# Patient Record
Sex: FEMALE | Race: WHITE | NOT HISPANIC OR LATINO | URBAN - METROPOLITAN AREA
[De-identification: names, ages, dates, MRNs, and addresses within clinical notes are randomized per-mention and may not be internally consistent; named-entity substitution may affect disease eponyms.]

---

## 2024-01-18 ENCOUNTER — OFFICE VISIT (OUTPATIENT)
Dept: URBAN - METROPOLITAN AREA CLINIC 48 | Facility: CLINIC | Age: 49
End: 2024-01-18

## 2024-01-18 RX ORDER — APIXABAN 5 MG/1
1 TABLET TABLET, FILM COATED ORAL TWICE A DAY
Qty: 60 TABLET | Refills: 2 | Status: ACTIVE | COMMUNITY

## 2024-01-18 RX ORDER — TIZANIDINE 4 MG/1
1 TABLET AS NEEDED TABLET ORAL THREE TIMES A DAY
Qty: 90 TABLET | Refills: 0 | Status: ACTIVE | COMMUNITY

## 2024-01-18 RX ORDER — METOPROLOL TARTRATE 25 MG/1
1 TABLET WITH FOOD TABLET, FILM COATED ORAL TWICE A DAY
Qty: 180 TABLET | Refills: 0 | Status: ACTIVE | COMMUNITY

## 2024-01-18 RX ORDER — DULOXETINE 60 MG/1
CAPSULE, DELAYED RELEASE ORAL
Qty: 60 APPLICATOR | Refills: 0 | Status: ACTIVE | COMMUNITY

## 2024-01-18 RX ORDER — GABAPENTIN 400 MG/1
1 CAPSULE CAPSULE ORAL
Refills: 0 | Status: ACTIVE | COMMUNITY

## 2024-01-18 RX ORDER — SUMATRIPTAN 50 MG/1
1 TABLET AT LEAST 2 HOURS BETWEEN DOSES AS NEEDED TABLET, FILM COATED ORAL TWICE A DAY
Refills: 0 | Status: ACTIVE | COMMUNITY

## 2024-01-18 RX ORDER — TRAMADOL HYDROCHLORIDE 50 MG/1
1 TABLET AS NEEDED TABLET, FILM COATED ORAL
Refills: 0 | Status: ACTIVE | COMMUNITY

## 2024-01-18 RX ORDER — AZITHROMYCIN DIHYDRATE 250 MG/1
AS DIRECTED TABLET ORAL
Refills: 0 | Status: ACTIVE | COMMUNITY

## 2024-01-18 RX ORDER — FENOFIBRATE 134 MG/1
1 CAPSULE WITH A MEAL CAPSULE ORAL ONCE A DAY
Qty: 90 CAPSULE | Refills: 1 | Status: ACTIVE | COMMUNITY

## 2024-01-18 RX ORDER — CLONAZEPAM 1 MG/1
1 TABLET TABLET ORAL ONCE A DAY
Qty: 30 TABLET | Refills: 1 | Status: ACTIVE | COMMUNITY

## 2024-01-18 RX ORDER — PROMETHAZINE HYDROCHLORIDE 25 MG/1
1 TABLET AS NEEDED TABLET ORAL
Qty: 60 TABLET | Refills: 0 | Status: ACTIVE | COMMUNITY

## 2024-03-14 ENCOUNTER — OV NP (OUTPATIENT)
Dept: URBAN - METROPOLITAN AREA CLINIC 48 | Facility: CLINIC | Age: 49
End: 2024-03-14

## 2024-03-14 RX ORDER — APIXABAN 5 MG/1
1 TABLET TABLET, FILM COATED ORAL TWICE A DAY
Qty: 60 TABLET | Refills: 2 | Status: ACTIVE | COMMUNITY

## 2024-03-14 RX ORDER — FENOFIBRATE 134 MG/1
1 CAPSULE WITH A MEAL CAPSULE ORAL ONCE A DAY
Qty: 90 CAPSULE | Refills: 1 | Status: ACTIVE | COMMUNITY

## 2024-03-14 RX ORDER — TIZANIDINE 4 MG/1
1 TABLET AS NEEDED TABLET ORAL THREE TIMES A DAY
Qty: 90 TABLET | Refills: 0 | Status: ACTIVE | COMMUNITY

## 2024-03-14 RX ORDER — IBUPROFEN 600 MG/1
1 TABLET WITH FOOD OR MILK AS NEEDED TABLET, FILM COATED ORAL THREE TIMES A DAY
Refills: 0 | Status: ACTIVE | COMMUNITY
Start: 1900-01-01

## 2024-03-14 RX ORDER — DULOXETINE 60 MG/1
CAPSULE, DELAYED RELEASE ORAL
Qty: 60 APPLICATOR | Refills: 0 | Status: ACTIVE | COMMUNITY

## 2024-03-14 RX ORDER — SUMATRIPTAN 50 MG/1
1 TABLET AT LEAST 2 HOURS BETWEEN DOSES AS NEEDED TABLET, FILM COATED ORAL TWICE A DAY
Refills: 0 | Status: ACTIVE | COMMUNITY

## 2024-03-14 RX ORDER — AMITRIPTYLINE HYDROCHLORIDE 25 MG/1
1 TABLET AT BEDTIME TABLET, FILM COATED ORAL ONCE A DAY
Refills: 1 | Status: ACTIVE | COMMUNITY
Start: 2018-03-26

## 2024-03-14 RX ORDER — CLONAZEPAM 1 MG/1
1 TABLET TABLET ORAL ONCE A DAY
Qty: 30 TABLET | Refills: 1 | Status: ACTIVE | COMMUNITY

## 2024-03-14 RX ORDER — METOPROLOL TARTRATE 25 MG/1
1 TABLET WITH FOOD TABLET, FILM COATED ORAL TWICE A DAY
Qty: 180 TABLET | Refills: 0 | Status: ACTIVE | COMMUNITY

## 2024-03-14 RX ORDER — PROMETHAZINE HYDROCHLORIDE 25 MG/1
1 TABLET AS NEEDED TABLET ORAL
Qty: 60 TABLET | Refills: 0 | Status: ACTIVE | COMMUNITY

## 2024-03-14 RX ORDER — TRAMADOL HYDROCHLORIDE 50 MG/1
1 TABLET AS NEEDED TABLET, FILM COATED ORAL
Refills: 0 | Status: ACTIVE | COMMUNITY

## 2024-03-14 RX ORDER — GABAPENTIN 400 MG/1
1 CAPSULE CAPSULE ORAL
Refills: 0 | Status: ACTIVE | COMMUNITY

## 2024-03-14 NOTE — HPI-TODAY'S VISIT:
49 year old female presents for evaluation of -----. Last colonoscopy was in 2017 and was normal. Pathology was negative for colitis.

## 2025-01-31 ENCOUNTER — OFFICE VISIT (OUTPATIENT)
Dept: URBAN - METROPOLITAN AREA CLINIC 70 | Facility: CLINIC | Age: 50
End: 2025-01-31

## 2025-02-03 ENCOUNTER — OFFICE VISIT (OUTPATIENT)
Dept: URBAN - METROPOLITAN AREA CLINIC 70 | Facility: CLINIC | Age: 50
End: 2025-02-03

## 2025-02-06 ENCOUNTER — OFFICE VISIT (OUTPATIENT)
Dept: URBAN - METROPOLITAN AREA CLINIC 70 | Facility: CLINIC | Age: 50
End: 2025-02-06

## 2025-02-07 ENCOUNTER — OFFICE VISIT (OUTPATIENT)
Dept: URBAN - METROPOLITAN AREA CLINIC 70 | Facility: CLINIC | Age: 50
End: 2025-02-07

## 2025-02-07 RX ORDER — PROMETHAZINE HYDROCHLORIDE 25 MG/1
1 TABLET AS NEEDED TABLET ORAL
Qty: 60 TABLET | Refills: 0 | Status: ACTIVE | COMMUNITY

## 2025-02-07 RX ORDER — METOPROLOL TARTRATE 25 MG/1
1 TABLET WITH FOOD TABLET, FILM COATED ORAL TWICE A DAY
Qty: 180 TABLET | Refills: 0 | Status: ACTIVE | COMMUNITY

## 2025-02-07 RX ORDER — AMITRIPTYLINE HYDROCHLORIDE 25 MG/1
1 TABLET AT BEDTIME TABLET, FILM COATED ORAL ONCE A DAY
Refills: 1 | Status: ACTIVE | COMMUNITY
Start: 2018-03-26

## 2025-02-07 RX ORDER — IBUPROFEN 600 MG/1
1 TABLET WITH FOOD OR MILK AS NEEDED TABLET, FILM COATED ORAL THREE TIMES A DAY
Refills: 0 | Status: ACTIVE | COMMUNITY
Start: 1900-01-01

## 2025-02-07 RX ORDER — APIXABAN 5 MG/1
1 TABLET TABLET, FILM COATED ORAL TWICE A DAY
Qty: 60 TABLET | Refills: 2 | Status: ACTIVE | COMMUNITY

## 2025-02-07 RX ORDER — GABAPENTIN 400 MG/1
1 CAPSULE CAPSULE ORAL
Refills: 0 | Status: ACTIVE | COMMUNITY

## 2025-02-07 RX ORDER — TIZANIDINE 4 MG/1
1 TABLET AS NEEDED TABLET ORAL THREE TIMES A DAY
Qty: 90 TABLET | Refills: 0 | Status: ACTIVE | COMMUNITY

## 2025-02-07 RX ORDER — AZITHROMYCIN DIHYDRATE 250 MG/1
AS DIRECTED TABLET ORAL
Refills: 0 | Status: ACTIVE | COMMUNITY

## 2025-02-07 RX ORDER — TRAMADOL HYDROCHLORIDE 50 MG/1
1 TABLET AS NEEDED TABLET, FILM COATED ORAL
Refills: 0 | Status: ACTIVE | COMMUNITY

## 2025-02-07 RX ORDER — DULOXETINE 60 MG/1
CAPSULE, DELAYED RELEASE ORAL
Qty: 60 APPLICATOR | Refills: 0 | Status: ACTIVE | COMMUNITY

## 2025-02-07 RX ORDER — FENOFIBRATE 134 MG/1
1 CAPSULE WITH A MEAL CAPSULE ORAL ONCE A DAY
Qty: 90 CAPSULE | Refills: 1 | Status: ACTIVE | COMMUNITY

## 2025-02-07 RX ORDER — CLONAZEPAM 1 MG/1
1 TABLET TABLET ORAL ONCE A DAY
Qty: 30 TABLET | Refills: 1 | Status: ACTIVE | COMMUNITY

## 2025-02-07 RX ORDER — SUMATRIPTAN 50 MG/1
1 TABLET AT LEAST 2 HOURS BETWEEN DOSES AS NEEDED TABLET, FILM COATED ORAL TWICE A DAY
Refills: 0 | Status: ACTIVE | COMMUNITY

## 2025-02-11 ENCOUNTER — LAB OUTSIDE AN ENCOUNTER (OUTPATIENT)
Dept: URBAN - METROPOLITAN AREA CLINIC 70 | Facility: CLINIC | Age: 50
End: 2025-02-11

## 2025-02-11 ENCOUNTER — DASHBOARD ENCOUNTERS (OUTPATIENT)
Age: 50
End: 2025-02-11

## 2025-02-11 ENCOUNTER — OFFICE VISIT (OUTPATIENT)
Dept: URBAN - METROPOLITAN AREA CLINIC 70 | Facility: CLINIC | Age: 50
End: 2025-02-11
Payer: COMMERCIAL

## 2025-02-11 VITALS
HEIGHT: 64 IN | DIASTOLIC BLOOD PRESSURE: 89 MMHG | TEMPERATURE: 98.1 F | WEIGHT: 214.2 LBS | SYSTOLIC BLOOD PRESSURE: 138 MMHG | HEART RATE: 76 BPM | OXYGEN SATURATION: 97 % | BODY MASS INDEX: 36.57 KG/M2

## 2025-02-11 DIAGNOSIS — K62.5 RECTAL BLEEDING: ICD-10-CM

## 2025-02-11 DIAGNOSIS — R13.10 DYSPHAGIA, UNSPECIFIED TYPE: ICD-10-CM

## 2025-02-11 DIAGNOSIS — R19.4 CHANGE IN BOWEL HABITS: ICD-10-CM

## 2025-02-11 DIAGNOSIS — Z12.11 COLON CANCER SCREENING: ICD-10-CM

## 2025-02-11 DIAGNOSIS — R19.7 DIARRHEA, UNSPECIFIED TYPE: ICD-10-CM

## 2025-02-11 PROBLEM — 40739000: Status: ACTIVE | Noted: 2025-02-11

## 2025-02-11 PROCEDURE — 99204 OFFICE O/P NEW MOD 45 MIN: CPT | Performed by: NURSE PRACTITIONER

## 2025-02-11 RX ORDER — DULOXETINE 30 MG/1
1 CAPSULE CAPSULE, DELAYED RELEASE PELLETS ORAL ONCE A DAY
Qty: 30 CAPSULE | Refills: 0 | Status: ACTIVE | COMMUNITY

## 2025-02-11 RX ORDER — AMITRIPTYLINE HYDROCHLORIDE 25 MG/1
1 TABLET AT BEDTIME TABLET, FILM COATED ORAL ONCE A DAY
Refills: 1 | Status: ON HOLD | COMMUNITY
Start: 2018-03-26

## 2025-02-11 RX ORDER — SUMATRIPTAN 50 MG/1
1 TABLET AT LEAST 2 HOURS BETWEEN DOSES AS NEEDED TABLET, FILM COATED ORAL TWICE A DAY
Refills: 0 | Status: ACTIVE | COMMUNITY

## 2025-02-11 RX ORDER — AZITHROMYCIN DIHYDRATE 250 MG/1
AS DIRECTED TABLET ORAL
Refills: 0 | Status: ON HOLD | COMMUNITY

## 2025-02-11 RX ORDER — APIXABAN 5 MG/1
1 TABLET TABLET, FILM COATED ORAL TWICE A DAY
Qty: 60 TABLET | Refills: 2 | Status: ACTIVE | COMMUNITY

## 2025-02-11 RX ORDER — PANTOPRAZOLE SODIUM 40 MG/1
1 TABLET TABLET, DELAYED RELEASE ORAL ONCE A DAY
Qty: 90 TABLET | Refills: 3 | OUTPATIENT
Start: 2025-02-11

## 2025-02-11 RX ORDER — PROMETHAZINE HYDROCHLORIDE 25 MG/1
1 TABLET AS NEEDED TABLET ORAL
Qty: 60 TABLET | Refills: 0 | Status: ACTIVE | COMMUNITY

## 2025-02-11 RX ORDER — IBUPROFEN 600 MG/1
1 TABLET WITH FOOD OR MILK AS NEEDED TABLET, FILM COATED ORAL THREE TIMES A DAY
Refills: 0 | Status: ON HOLD | COMMUNITY
Start: 1900-01-01

## 2025-02-11 RX ORDER — TRAMADOL HYDROCHLORIDE 50 MG/1
1 TABLET AS NEEDED TABLET, FILM COATED ORAL
Refills: 0 | Status: ACTIVE | COMMUNITY

## 2025-02-11 RX ORDER — CLONAZEPAM 1 MG/1
1 TABLET TABLET ORAL ONCE A DAY
Qty: 30 TABLET | Refills: 1 | Status: ACTIVE | COMMUNITY

## 2025-02-11 RX ORDER — FENOFIBRATE 134 MG/1
1 CAPSULE WITH A MEAL CAPSULE ORAL ONCE A DAY
Qty: 90 CAPSULE | Refills: 1 | Status: ON HOLD | COMMUNITY

## 2025-02-11 RX ORDER — METOPROLOL TARTRATE 25 MG/1
1 TABLET WITH FOOD TABLET, FILM COATED ORAL TWICE A DAY
Qty: 180 TABLET | Refills: 0 | Status: ACTIVE | COMMUNITY

## 2025-02-11 RX ORDER — GABAPENTIN 400 MG/1
1 CAPSULE CAPSULE ORAL
Refills: 0 | Status: ACTIVE | COMMUNITY

## 2025-02-11 RX ORDER — TIZANIDINE 4 MG/1
1 TABLET AS NEEDED TABLET ORAL THREE TIMES A DAY
Qty: 90 TABLET | Refills: 0 | Status: ACTIVE | COMMUNITY

## 2025-02-11 NOTE — HPI-TODAY'S VISIT:
02/11/252: 49-year-old female presents with multiple GI complaints: -Pain and pressure with moving her bowels, states she feels as though something is causing a hump that her stool has to clear, she describes her stool as thin and ribbon like - itching of the rectal area that she has treated with 4 rounds of Diflucan without resolution, using witch hazel and aloe  - upper abdominal pain at times -intermittent nausea with food and pills - pain in  RUQ and bilateral sides - watery diarrhea several times per week, 3-4 watery stools per day - occasional constipation -Intermittent rectal bleeding, BRB - Dysphagia with food, liquids, pills and saliva at times, reports that her neurologist is working her up for MS as a potential cause  -reports that her PCP checked her stool for blood, although she didn't see any the test was (+)